# Patient Record
Sex: FEMALE | Race: WHITE | Employment: STUDENT | URBAN - METROPOLITAN AREA
[De-identification: names, ages, dates, MRNs, and addresses within clinical notes are randomized per-mention and may not be internally consistent; named-entity substitution may affect disease eponyms.]

---
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
## 2023-11-07 ENCOUNTER — HOSPITAL ENCOUNTER (OUTPATIENT)
Age: 18
Discharge: HOME | End: 2023-11-07
Payer: COMMERCIAL

## 2023-11-07 VITALS
OXYGEN SATURATION: 100 % | RESPIRATION RATE: 18 BRPM | DIASTOLIC BLOOD PRESSURE: 72 MMHG | HEART RATE: 89 BPM | TEMPERATURE: 98 F | SYSTOLIC BLOOD PRESSURE: 113 MMHG

## 2023-11-07 DIAGNOSIS — H92.03 EAR PAIN, BILATERAL: ICD-10-CM

## 2023-11-07 DIAGNOSIS — R09.81 NASAL CONGESTION: Primary | ICD-10-CM

## 2023-11-07 PROCEDURE — 99213 OFFICE O/P EST LOW 20 MIN: CPT | Performed by: NURSE PRACTITIONER

## 2023-11-07 NOTE — DISCHARGE INSTRUCTIONS
Continue Sudafed. Take ibuprofen as needed for pain. Add Flonase and saline nasal spray for the congestion.   Follow-up with your primary doctor if persistent or worsening symptoms

## 2023-11-24 ENCOUNTER — HOSPITAL ENCOUNTER (OUTPATIENT)
Age: 18
Discharge: HOME | End: 2023-11-24
Payer: COMMERCIAL

## 2023-11-24 VITALS
RESPIRATION RATE: 16 BRPM | TEMPERATURE: 98 F | HEART RATE: 96 BPM | OXYGEN SATURATION: 99 % | DIASTOLIC BLOOD PRESSURE: 64 MMHG | SYSTOLIC BLOOD PRESSURE: 114 MMHG

## 2023-11-24 DIAGNOSIS — J02.9 ACUTE VIRAL PHARYNGITIS: Primary | ICD-10-CM

## 2023-11-24 LAB — S PYO AG THROAT QL IA.RAPID: NEGATIVE

## 2023-11-24 PROCEDURE — 87651 STREP A DNA AMP PROBE: CPT | Performed by: NURSE PRACTITIONER

## 2023-11-24 PROCEDURE — 99212 OFFICE O/P EST SF 10 MIN: CPT

## 2023-11-24 PROCEDURE — 99213 OFFICE O/P EST LOW 20 MIN: CPT

## 2023-11-24 NOTE — ED INITIAL ASSESSMENT (HPI)
Pt presents with sore throat, congestion and bilateral ear pain/fullness x 2 weeks. Seen at [address] [place] [address] 2 weeks ago, Covid- at that time.      No fevers

## 2024-11-05 ENCOUNTER — APPOINTMENT (OUTPATIENT)
Dept: GENERAL RADIOLOGY | Age: 19
End: 2024-11-05
Attending: EMERGENCY MEDICINE
Payer: COMMERCIAL

## 2024-11-05 ENCOUNTER — HOSPITAL ENCOUNTER (OUTPATIENT)
Age: 19
Discharge: HOME | End: 2024-11-05
Attending: EMERGENCY MEDICINE
Payer: COMMERCIAL

## 2024-11-05 VITALS
DIASTOLIC BLOOD PRESSURE: 66 MMHG | OXYGEN SATURATION: 100 % | TEMPERATURE: 99 F | SYSTOLIC BLOOD PRESSURE: 123 MMHG | HEART RATE: 89 BPM | RESPIRATION RATE: 16 BRPM

## 2024-11-05 DIAGNOSIS — S93.402A SPRAIN OF LEFT ANKLE, UNSPECIFIED LIGAMENT, INITIAL ENCOUNTER: Primary | ICD-10-CM

## 2024-11-05 PROCEDURE — 73610 X-RAY EXAM OF ANKLE: CPT | Performed by: EMERGENCY MEDICINE

## 2024-11-05 PROCEDURE — 99213 OFFICE O/P EST LOW 20 MIN: CPT

## 2024-11-05 PROCEDURE — 73630 X-RAY EXAM OF FOOT: CPT | Performed by: EMERGENCY MEDICINE

## 2024-11-05 NOTE — ED INITIAL ASSESSMENT (HPI)
Patient arrives ambulatory with c/o left leg pain after mis-stepping and falling yesterday. Denies pain with palpation, but does report pain when she twists her ankle, that radiates up leg

## 2024-11-05 NOTE — ED PROVIDER NOTES
Patient Seen in: [facility]      History     Chief Complaint   Patient presents with    Leg or Foot Injury     Stated Complaint: Foot Pain    Subjective:     19-year-old female presents today for evaluation of left ankle pain.  Patient reports she fell yesterday walking down the stairs.  She fell down 2 stairs and rolled her ankle.  She complains of left foot and ankle pain.  No other injuries.    Objective:   Past Medical History:    Seizures (HCC)    per mother first seizure was 18 months old- focal seizures              History reviewed. No pertinent surgical history.             Social History     Socioeconomic History    Marital status: Single   Tobacco Use    Smoking status: Never    Smokeless tobacco: Never   Vaping Use    Vaping status: Never Used   Substance and Sexual Activity    Alcohol use: Yes     Comment: occ    Drug use: Never    Sexual activity: Yes     Partners: Male     Birth control/protection: Condom, Implant                Physical Exam     ED Triage Vitals [11/05/24 1739]   /66   Pulse 89   Resp 16   Temp 99.2 °F (37.3 °C)   Temp src Temporal   SpO2 100 %   O2 Device None (Room air)       Current:/66   Pulse 89   Temp 99.2 °F (37.3 °C) (Temporal)   Resp 16   LMP 10/18/2024 (Exact Date)   SpO2 100%         Physical Exam  Vitals reviewed.   Constitutional:       General: She is not in acute distress.     Appearance: Normal appearance. She is not ill-appearing or toxic-appearing.   HENT:      Head: Normocephalic and atraumatic.      Mouth/Throat:      Pharynx: No pharyngeal swelling.   Eyes:      Conjunctiva/sclera: Conjunctivae normal.   Musculoskeletal:      Cervical back: Neck supple.      Left knee: Normal.      Left lower leg: Normal.      Left ankle: Tenderness present over the medial malleolus. Normal pulse.      Left foot: Tenderness (Medial) present. Normal pulse.   Skin:     General: Skin is warm and dry.   Neurological:      Mental Status: She is alert and  oriented to person, place, and time.   Psychiatric:         Mood and Affect: Mood normal.         ED Course   Labs Reviewed - No data to display  Imaging:  XR ANKLE (MIN 3 VIEWS), LEFT (CPT=73610)    Result Date: 11/5/2024  CONCLUSION:   Left foot:  No acute fracture or dislocation.  No significant arthropathy.  Soft tissues are unremarkable.  Left ankle:  No acute fracture or dislocation.  No significant arthropathy.  Mild swelling of the medial soft tissues.     elm-remote    Dictated by (CST): Chandra Garibay MD on 11/05/2024 at 6:31 PM     Finalized by (CST): Chandra Garibay MD on 11/05/2024 at 6:32 PM          XR FOOT, COMPLETE (MIN 3 VIEWS), LEFT (CPT=73630)    Result Date: 11/5/2024  CONCLUSION:   Left foot:  No acute fracture or dislocation.  No significant arthropathy.  Soft tissues are unremarkable.  Left ankle:  No acute fracture or dislocation.  No significant arthropathy.  Mild swelling of the medial soft tissues.     elm-remote    Dictated by (CST): Chandra Garibay MD on 11/05/2024 at 6:31 PM     Finalized by (CST): Chandra Garibay MD on 11/05/2024 at 6:32 PM           ED Course as of 11/05/24 1835  ------------------------------------------------------------  Time: 11/05 1835  Comment: Imaging is unremarkable.  Will place Aircast and discharged home.               MDM        19 year old female with left ankle injury.  Will check imaging.    Differential diagnosis (including but not limited to):  Sprain, strain, fracture    ED course:  Pulse Ox: 100% on room air which is normal      Comment: Please note this report has been produced using speech recognition software and may contain errors related to that system including errors in grammar, punctuation, and spelling, as well as words and phrases that may be inappropriate. If there are any questions or concerns please feel free to contact the dictating provider for clarification.          Medical Decision Making      Disposition and Plan     Clinical  Impression:  1. Sprain of left ankle, unspecified ligament, initial encounter         Disposition:  Discharge  11/5/2024  6:35 pm    Follow-up:  Tim Hartman MD  [address] [place] [id number]  [phone]    Schedule an appointment as soon as possible for a visit   This is a family practice doctor          Medications Prescribed:  Current Discharge Medication List              Supplementary Documentation:                                                       Luis Antonio Zheng MD  11/5/2024  5:57 PM

## 2024-11-06 NOTE — DISCHARGE INSTRUCTIONS
Thank you for visiting our immediate care for your health care needs.  Please follow up with the physician listed above if unable to get into your doctor in the next 1-2 days. If you have any additional problems please return to the immediate care.  Please take Tylenol and or ibuprofen for pain.

## 2025-06-16 ENCOUNTER — HOSPITAL ENCOUNTER (OUTPATIENT)
Age: 20
Discharge: HOME | End: 2025-06-16
Payer: COMMERCIAL

## 2025-06-16 VITALS
RESPIRATION RATE: 18 BRPM | TEMPERATURE: 99 F | DIASTOLIC BLOOD PRESSURE: 55 MMHG | SYSTOLIC BLOOD PRESSURE: 111 MMHG | HEART RATE: 98 BPM | OXYGEN SATURATION: 99 %

## 2025-06-16 DIAGNOSIS — J02.0 STREPTOCOCCAL SORE THROAT: Primary | ICD-10-CM

## 2025-06-16 LAB — S PYO AG THROAT QL IA.RAPID: POSITIVE

## 2025-06-16 PROCEDURE — 99213 OFFICE O/P EST LOW 20 MIN: CPT

## 2025-06-16 PROCEDURE — 87651 STREP A DNA AMP PROBE: CPT | Performed by: NURSE PRACTITIONER

## 2025-06-16 RX ORDER — CEFDINIR 300 MG/1
300 CAPSULE ORAL 2 TIMES DAILY
Qty: 20 CAPSULE | Refills: 0 | Status: SHIPPED | OUTPATIENT
Start: 2025-06-16 | End: 2025-06-26

## 2025-06-16 NOTE — DISCHARGE INSTRUCTIONS
Push fluids.  Rest.  Tylenol or ibuprofen as needed for pain or fever.  Take the antibiotics as prescribed.  Salt water gargles.  Follow-up as needed with your primary care provider.  Return for any concerns.

## 2025-06-16 NOTE — ED PROVIDER NOTES
He    Patient Seen in: [facility]      History     Chief Complaint   Patient presents with    Sore Throat     Stated Complaint: sore throat  Subjective:   20-year-old healthy female presents for sore throat that she woke up with this morning.  No difficulty swallowing.  Speech is clear.  No difficulty breathing or stridor.  No fevers or chills.  No headaches or dizziness.  No cough or congestion.  She is eating and drinking without nausea, vomiting, or diarrhea.  No neck stiffness.  No rashes.  She appears nontoxic.  No known exposure to strep throat.      Objective:   Past Medical History:    Seizures (HCC)    per mother first seizure was 18 months old- focal seizures            History reviewed. No pertinent surgical history.           Social History     Socioeconomic History    Marital status: Single   Tobacco Use    Smoking status: Never    Smokeless tobacco: Never   Vaping Use    Vaping status: Never Used   Substance and Sexual Activity    Alcohol use: Yes     Comment: occ    Drug use: Never    Sexual activity: Yes     Partners: Male     Birth control/protection: Condom, Implant            Review of Systems    Positive for stated complaint: Sore Throat     Other systems are as noted in HPI.  Constitutional and vital signs reviewed.      All other systems reviewed and negative except as noted above.    Physical Exam     ED Triage Vitals [06/16/25 0817]   /55   Pulse 98   Resp 18   Temp 98.8 °F (37.1 °C)   Temp src Oral   SpO2 99 %   O2 Device None (Room air)     Current:/55   Pulse 98   Temp 98.8 °F (37.1 °C) (Oral)   Resp 18   LMP 06/02/2025 (Exact Date)   SpO2 99%     Physical Exam  Vitals and nursing note reviewed.   Constitutional:       General: She is not in acute distress.     Appearance: Normal appearance. She is not toxic-appearing.   HENT:      Head: Normocephalic.      Right Ear: Tympanic membrane normal.      Left Ear: Tympanic membrane normal.      Nose: Nose normal.       Mouth/Throat:      Mouth: Mucous membranes are moist.      Pharynx: Oropharyngeal exudate and posterior oropharyngeal erythema present.   Eyes:      Conjunctiva/sclera: Conjunctivae normal.      Pupils: Pupils are equal, round, and reactive to light.   Cardiovascular:      Rate and Rhythm: Normal rate and regular rhythm.   Pulmonary:      Effort: Pulmonary effort is normal.      Breath sounds: Normal breath sounds.   Musculoskeletal:         General: Normal range of motion.      Cervical back: Normal range of motion and neck supple. No tenderness.   Lymphadenopathy:      Cervical: Cervical adenopathy present.   Skin:     General: Skin is warm and dry.      Capillary Refill: Capillary refill takes less than 2 seconds.      Findings: No rash.   Neurological:      General: No focal deficit present.      Mental Status: She is alert and oriented to person, place, and time.   Psychiatric:         Mood and Affect: Mood normal.         Behavior: Behavior normal.         ED Course   No results found.  Labs Reviewed   RAPID STREP A - Abnormal; Notable for the following components:       Result Value    Strep A by PCR Positive (*)     All other components within normal limits       MDM     Medical Decision Making  Patient is aware of the strep test results below.  She does have an allergy to penicillin which is a rash, I will place her on a cephalosporin.  We discussed pushing fluids, rest, and Tylenol or Motrin as needed for pain or fever.  She will change out her toothbrush when she is on antibiotics for 24 hours.  We discussed trying sour gargles to help with the sore throat.  She will follow-up as needed with her primary care provider.    Amount and/or Complexity of Data Reviewed  Labs: ordered.     Details: Strep positive    Risk  OTC drugs.  Prescription drug management.  Risk Details: Strep throat versus viral pharyngitis        Disposition and Plan     Clinical Impression:  1. Streptococcal sore throat          Disposition:  Discharge  6/16/2025  8:34 am    Follow-up:  PMD    Schedule an appointment as soon as possible for a visit   As needed          Medications Prescribed:  Current Discharge Medication List        START taking these medications    Details   cefdinir 300 MG Oral Cap Take 1 capsule (300 mg total) by mouth 2 (two) times daily for 10 days.  Qty: 20 capsule, Refills: 0